# Patient Record
Sex: MALE
[De-identification: names, ages, dates, MRNs, and addresses within clinical notes are randomized per-mention and may not be internally consistent; named-entity substitution may affect disease eponyms.]

---

## 2019-04-24 PROBLEM — Z00.00 ENCOUNTER FOR PREVENTIVE HEALTH EXAMINATION: Status: ACTIVE | Noted: 2019-04-24

## 2019-04-29 ENCOUNTER — APPOINTMENT (OUTPATIENT)
Dept: COLORECTAL SURGERY | Facility: CLINIC | Age: 24
End: 2019-04-29
Payer: COMMERCIAL

## 2019-04-29 VITALS
HEART RATE: 76 BPM | TEMPERATURE: 99.2 F | SYSTOLIC BLOOD PRESSURE: 150 MMHG | DIASTOLIC BLOOD PRESSURE: 83 MMHG | HEIGHT: 67 IN | BODY MASS INDEX: 21.97 KG/M2 | WEIGHT: 140 LBS

## 2019-04-29 DIAGNOSIS — K62.82 DYSPLASIA OF ANUS: ICD-10-CM

## 2019-04-29 DIAGNOSIS — A74.9 CHLAMYDIAL INFECTION, UNSPECIFIED: ICD-10-CM

## 2019-04-29 DIAGNOSIS — Z86.19 PERSONAL HISTORY OF OTHER INFECTIOUS AND PARASITIC DISEASES: ICD-10-CM

## 2019-04-29 DIAGNOSIS — Z78.9 OTHER SPECIFIED HEALTH STATUS: ICD-10-CM

## 2019-04-29 PROCEDURE — 46600 DIAGNOSTIC ANOSCOPY SPX: CPT

## 2019-04-29 PROCEDURE — 99202 OFFICE O/P NEW SF 15 MIN: CPT | Mod: 25

## 2019-05-01 LAB — ANAL PAP CYTOLOGY: NORMAL

## 2019-05-01 NOTE — CONSULT LETTER
[Dear  ___] : Dear  [unfilled], [Consult Letter:] : I had the pleasure of evaluating your patient, [unfilled]. [Please see my note below.] : Please see my note below. [( Thank you for referring [unfilled] for consultation for _____ )] : Thank you for referring [unfilled] for consultation for [unfilled] [Sincerely,] : Sincerely, [FreeTextEntry2] : FORREST ROB\par [address]  [address] \[place] [address]\par  [FreeTextEntry3] : [place] KRISHNAMURTHY MD

## 2019-05-01 NOTE — PHYSICAL EXAM
[Normal] : was normal [None] : there was no rectal mass  [Excoriation] : no perianal excoriation [de-identified] : Anal pap performed [Wart] : no warts [FreeTextEntry1] : A lighted anoscope was passed into the anal canal and the entire anal mucosal surface was inspected..  THe findings revealed minimal internal hemorrhoids. No masses or lesions were identified.\par \par

## 2020-06-01 ENCOUNTER — APPOINTMENT (OUTPATIENT)
Dept: COLORECTAL SURGERY | Facility: CLINIC | Age: 25
End: 2020-06-01

## 2024-07-15 NOTE — ASSESSMENT
[FreeTextEntry1] : I have reviewed with the patient the clinical and natural history of human papilloma virus and its relationship to the anus. The risks and associated consequences including sexual transmission, anal warts, anal dysplasia, and the risk of anal cancer have been outlined. The need for long-term surveillance and followup has been detailed. The treatment options including high resolution anoscopy and its associated risk of recurrence and post procedure stricture and pain compared to continued close surveillance and monitoring were reviewed. The patient wishes to proceed with surveillance and management of identified visible/palpable lesions as identified.\par \par I have personally spent 30 minutes with the patient with greater than 50% of the time counseling cord in the patient's care.\par 
Heterosexual